# Patient Record
Sex: FEMALE | Race: BLACK OR AFRICAN AMERICAN | NOT HISPANIC OR LATINO | Employment: FULL TIME | URBAN - METROPOLITAN AREA
[De-identification: names, ages, dates, MRNs, and addresses within clinical notes are randomized per-mention and may not be internally consistent; named-entity substitution may affect disease eponyms.]

---
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
## 2022-07-24 ENCOUNTER — HOSPITAL ENCOUNTER (EMERGENCY)
Facility: CLINIC | Age: 22
Discharge: HOME | End: 2022-07-25
Attending: EMERGENCY MEDICINE | Admitting: EMERGENCY MEDICINE
Payer: COMMERCIAL

## 2022-07-24 DIAGNOSIS — J06.9 VIRAL URI WITH COUGH: ICD-10-CM

## 2022-07-24 DIAGNOSIS — U07.1 INFECTION DUE TO 2019 NOVEL CORONAVIRUS: ICD-10-CM

## 2022-07-24 DIAGNOSIS — H66.001 ACUTE SUPPURATIVE OTITIS MEDIA OF RIGHT EAR WITHOUT SPONTANEOUS RUPTURE OF TYMPANIC MEMBRANE, RECURRENCE NOT SPECIFIED: ICD-10-CM

## 2022-07-24 LAB — DEPRECATED S PYO AG THROAT QL EIA: NEGATIVE

## 2022-07-24 PROCEDURE — 250N000013 HC RX MED GY IP 250 OP 250 PS 637: Performed by: EMERGENCY MEDICINE

## 2022-07-24 PROCEDURE — 87651 STREP A DNA AMP PROBE: CPT | Performed by: EMERGENCY MEDICINE

## 2022-07-24 PROCEDURE — 87637 SARSCOV2&INF A&B&RSV AMP PRB: CPT | Performed by: EMERGENCY MEDICINE

## 2022-07-24 PROCEDURE — 99283 EMERGENCY DEPT VISIT LOW MDM: CPT | Mod: CS

## 2022-07-24 RX ORDER — ACETAMINOPHEN 500 MG
1000 TABLET ORAL ONCE
Status: COMPLETED | OUTPATIENT
Start: 2022-07-24 | End: 2022-07-24

## 2022-07-24 RX ADMIN — ACETAMINOPHEN 1000 MG: 500 TABLET, FILM COATED ORAL at 22:48

## 2022-07-24 NOTE — LETTER
July 25, 2022      To Whom It May Concern:      Patricia Wang was seen in our Emergency Department today, 07/25/22.  I expect her condition to improve over the next 3 days.  She may return to work when improved.    Sincerely,        Yarelis HORTON RN

## 2022-07-25 VITALS
RESPIRATION RATE: 16 BRPM | OXYGEN SATURATION: 99 % | DIASTOLIC BLOOD PRESSURE: 60 MMHG | TEMPERATURE: 99.3 F | HEART RATE: 107 BPM | SYSTOLIC BLOOD PRESSURE: 99 MMHG

## 2022-07-25 LAB
FLUAV RNA SPEC QL NAA+PROBE: NEGATIVE
FLUBV RNA RESP QL NAA+PROBE: NEGATIVE
GROUP A STREP BY PCR: NOT DETECTED
RSV RNA SPEC NAA+PROBE: NEGATIVE
SARS-COV-2 RNA RESP QL NAA+PROBE: POSITIVE

## 2022-07-25 PROCEDURE — 250N000013 HC RX MED GY IP 250 OP 250 PS 637: Performed by: EMERGENCY MEDICINE

## 2022-07-25 PROCEDURE — 250N000009 HC RX 250: Performed by: EMERGENCY MEDICINE

## 2022-07-25 RX ORDER — METOCLOPRAMIDE 10 MG/1
10 TABLET ORAL 3 TIMES DAILY PRN
Qty: 20 TABLET | Refills: 0 | Status: SHIPPED | OUTPATIENT
Start: 2022-07-25 | End: 2022-07-25

## 2022-07-25 RX ORDER — AMOXICILLIN 875 MG
875 TABLET ORAL ONCE
Status: COMPLETED | OUTPATIENT
Start: 2022-07-25 | End: 2022-07-25

## 2022-07-25 RX ORDER — OXYMETAZOLINE HYDROCHLORIDE 0.05 G/100ML
2 SPRAY NASAL ONCE
Status: COMPLETED | OUTPATIENT
Start: 2022-07-25 | End: 2022-07-25

## 2022-07-25 RX ORDER — AMOXICILLIN 875 MG
875 TABLET ORAL EVERY 12 HOURS SCHEDULED
Status: DISCONTINUED | OUTPATIENT
Start: 2022-07-25 | End: 2022-07-25

## 2022-07-25 RX ORDER — AMOXICILLIN 875 MG
875 TABLET ORAL 2 TIMES DAILY
Qty: 14 TABLET | Refills: 0 | Status: SHIPPED | OUTPATIENT
Start: 2022-07-25 | End: 2022-08-01

## 2022-07-25 RX ORDER — METOCLOPRAMIDE 10 MG/1
10 TABLET ORAL 3 TIMES DAILY PRN
Qty: 20 TABLET | Refills: 0 | Status: SHIPPED | OUTPATIENT
Start: 2022-07-25

## 2022-07-25 RX ADMIN — AMOXICILLIN 875 MG: 875 TABLET, COATED ORAL at 00:41

## 2022-07-25 RX ADMIN — Medication 2 SPRAY: at 00:41

## 2022-07-25 ASSESSMENT — ENCOUNTER SYMPTOMS
DIFFICULTY URINATING: 0
FREQUENCY: 0
VOMITING: 0
DYSURIA: 0
FEVER: 1
SORE THROAT: 1
COUGH: 1
DIARRHEA: 0

## 2022-07-25 NOTE — ED PROVIDER NOTES
History   Chief Complaint:  Multiple symptoms     The history is provided by the patient.      Patricia Wang is a 22 year old female who presents with left ear pressure. The patient reports that she has been having right ear pressure and states that she cannot hear out of that ear either, with associated sore throat, cough, and fever. She states that she also had strep throat about 3 weeks ago but was unable to finish her antibiotics. She denies any vomiting, diarrhea, urinary issues, or ear drainage.     Review of Systems   Constitutional: Positive for fever.   HENT: Positive for ear pain, hearing loss and sore throat. Negative for ear discharge.    Respiratory: Positive for cough.    Gastrointestinal: Negative for diarrhea and vomiting.   Genitourinary: Negative for difficulty urinating, dysuria and frequency.   All other systems reviewed and are negative.        Allergies:  The patient has no known allergies.     Medications:  Adalimumab  Loteprednol    Past Medical History:     Secondary Iridocyclitis, Noninfectious   Juvenile Rheumatoid Arthritis With Systemic Onset Unspecified Site (HCC)  Major Depressive Disorder  Psoriasiform Eruption  Anemia, Iron Deficiency  Chronic Neutropenia    Family History:    Pacemaker - Father  Asthma - Brother    Social History:  The patient presents to the ED alone.   Arrived via private car.    Physical Exam     Patient Vitals for the past 24 hrs:   BP Temp Temp src Pulse Resp SpO2   07/24/22 2245 99/60 99.3  F (37.4  C) Temporal 107 16 99 %       Physical Exam    HEENT:         Normal conjunctiva, No  discharge           R TM erythematous and suppurative effusion, external ear and EAC normal         L  TM normal, external ear and EAC normal           Posterior oropharynx:               Yes erythema,               No exudates,               Tonsillar hypertrophy - no              uvula midline - Yes    Neck: no adenopathy      Lungs:     clear to auscultation    Heart:  Regular, no m/r/g, ppi    Neuro: alert, no focal gross focal deficits    Psych: Normal mood and affect            Emergency Department Course   Laboratory:  Labs Ordered and Resulted from Time of ED Arrival to Time of ED Departure   INFLUENZA A/B & SARS-COV2 PCR MULTIPLEX - Abnormal       Result Value    Influenza A PCR Negative      Influenza B PCR Negative      RSV PCR Negative      SARS CoV2 PCR Positive (*)    STREPTOCOCCUS A RAPID SCREEN W REFELX TO PCR - Normal    Group A Strep antigen Negative     GROUP A STREPTOCOCCUS PCR THROAT SWAB        Emergency Department Course:     Reviewed:  I reviewed nursing notes, vitals and past medical history    Assessments:  2359 I obtained history and examined the patient as noted above.     Interventions:  2248 Tylenol 1,000 mg oral   0041 Amoxicillin 875 mg goral  0041 Afrin 0.05% spray 2 sprays nasal     Disposition:  The patient was discharged to home.     Impression & Plan   Medical Decision Making:  URI sx with R AOM.  Amox for AOM, covid +.  No hypoxia, appears well, DC home.      Diagnosis:    ICD-10-CM    1. Viral URI with cough  J06.9    2. Acute suppurative otitis media of right ear without spontaneous rupture of tympanic membrane, recurrence not specified  H66.001    3. Infection due to 2019 novel coronavirus  U07.1        Discharge Medications:  New Prescriptions    AMOXICILLIN (AMOXIL) 875 MG TABLET    Take 1 tablet (875 mg) by mouth 2 times daily for 7 days    METOCLOPRAMIDE (REGLAN) 10 MG TABLET    Take 1 tablet (10 mg) by mouth 3 times daily as needed (Nausea or Vomiting)       Scribe Disclosure:  I, Oscar Galicia, am serving as a scribe at 11:25 PM on 7/24/2022 to document services personally performed by Keith Rios MD based on my observations and the provider's statements to me.            Keith Rios MD  07/25/22 0280

## 2022-07-25 NOTE — ED TRIAGE NOTES
"Right ear pain, posterior headache, cough, \"a lot of mucus in my nose\". Pt had strep a couple weeks ago - stopped abx early. Vaccinated for covid, no booster.      "

## 2023-02-09 ENCOUNTER — HOSPITAL ENCOUNTER (EMERGENCY)
Facility: CLINIC | Age: 23
Discharge: HOME | End: 2023-02-09
Attending: STUDENT IN AN ORGANIZED HEALTH CARE EDUCATION/TRAINING PROGRAM | Admitting: STUDENT IN AN ORGANIZED HEALTH CARE EDUCATION/TRAINING PROGRAM
Payer: COMMERCIAL

## 2023-02-09 ENCOUNTER — APPOINTMENT (OUTPATIENT)
Dept: ULTRASOUND IMAGING | Facility: CLINIC | Age: 23
End: 2023-02-09
Attending: STUDENT IN AN ORGANIZED HEALTH CARE EDUCATION/TRAINING PROGRAM
Payer: COMMERCIAL

## 2023-02-09 VITALS
OXYGEN SATURATION: 98 % | TEMPERATURE: 98.2 F | BODY MASS INDEX: 16.48 KG/M2 | WEIGHT: 96 LBS | HEART RATE: 89 BPM | RESPIRATION RATE: 20 BRPM | DIASTOLIC BLOOD PRESSURE: 62 MMHG | SYSTOLIC BLOOD PRESSURE: 109 MMHG

## 2023-02-09 DIAGNOSIS — R19.7 VOMITING AND DIARRHEA: ICD-10-CM

## 2023-02-09 DIAGNOSIS — R11.10 VOMITING AND DIARRHEA: ICD-10-CM

## 2023-02-09 LAB
ALBUMIN SERPL BCG-MCNC: 4.3 G/DL (ref 3.5–5.2)
ALBUMIN UR-MCNC: 50 MG/DL
ALP SERPL-CCNC: 82 U/L (ref 35–104)
ALT SERPL W P-5'-P-CCNC: 15 U/L (ref 10–35)
ANION GAP SERPL CALCULATED.3IONS-SCNC: 16 MMOL/L (ref 7–15)
APPEARANCE UR: ABNORMAL
AST SERPL W P-5'-P-CCNC: 38 U/L (ref 10–35)
BASOPHILS # BLD AUTO: 0 10E3/UL (ref 0–0.2)
BASOPHILS NFR BLD AUTO: 0 %
BILIRUB DIRECT SERPL-MCNC: <0.2 MG/DL (ref 0–0.3)
BILIRUB SERPL-MCNC: 0.3 MG/DL
BILIRUB UR QL STRIP: NEGATIVE
BUN SERPL-MCNC: 5.9 MG/DL (ref 6–20)
CALCIUM SERPL-MCNC: 8.6 MG/DL (ref 8.6–10)
CHLORIDE SERPL-SCNC: 101 MMOL/L (ref 98–107)
COLOR UR AUTO: YELLOW
CREAT SERPL-MCNC: 0.56 MG/DL (ref 0.51–0.95)
DEPRECATED HCO3 PLAS-SCNC: 23 MMOL/L (ref 22–29)
DEPRECATED S PYO AG THROAT QL EIA: NEGATIVE
EOSINOPHIL # BLD AUTO: 0 10E3/UL (ref 0–0.7)
EOSINOPHIL NFR BLD AUTO: 0 %
ERYTHROCYTE [DISTWIDTH] IN BLOOD BY AUTOMATED COUNT: 21 % (ref 10–15)
GFR SERPL CREATININE-BSD FRML MDRD: >90 ML/MIN/1.73M2
GLUCOSE SERPL-MCNC: 114 MG/DL (ref 70–99)
GLUCOSE UR STRIP-MCNC: NEGATIVE MG/DL
GROUP A STREP BY PCR: NOT DETECTED
HCG UR QL: NEGATIVE
HCT VFR BLD AUTO: 33.5 % (ref 35–47)
HGB BLD-MCNC: 9.5 G/DL (ref 11.7–15.7)
HGB UR QL STRIP: ABNORMAL
HOLD SPECIMEN: NORMAL
HOLD SPECIMEN: NORMAL
IMM GRANULOCYTES # BLD: 0 10E3/UL
IMM GRANULOCYTES NFR BLD: 0 %
KETONES UR STRIP-MCNC: ABNORMAL MG/DL
LEUKOCYTE ESTERASE UR QL STRIP: NEGATIVE
LYMPHOCYTES # BLD AUTO: 1 10E3/UL (ref 0.8–5.3)
LYMPHOCYTES NFR BLD AUTO: 14 %
MCH RBC QN AUTO: 18.8 PG (ref 26.5–33)
MCHC RBC AUTO-ENTMCNC: 28.4 G/DL (ref 31.5–36.5)
MCV RBC AUTO: 66 FL (ref 78–100)
MONOCYTES # BLD AUTO: 0.7 10E3/UL (ref 0–1.3)
MONOCYTES NFR BLD AUTO: 9 %
MUCOUS THREADS #/AREA URNS LPF: PRESENT /LPF
NEUTROPHILS # BLD AUTO: 5.5 10E3/UL (ref 1.6–8.3)
NEUTROPHILS NFR BLD AUTO: 77 %
NITRATE UR QL: NEGATIVE
NRBC # BLD AUTO: 0 10E3/UL
NRBC BLD AUTO-RTO: 0 /100
PH UR STRIP: 7.5 [PH] (ref 5–7)
PLATELET # BLD AUTO: 376 10E3/UL (ref 150–450)
POTASSIUM SERPL-SCNC: 3.4 MMOL/L (ref 3.4–5.3)
PROT SERPL-MCNC: 8.3 G/DL (ref 6.4–8.3)
RBC # BLD AUTO: 5.06 10E6/UL (ref 3.8–5.2)
RBC URINE: >182 /HPF
SODIUM SERPL-SCNC: 140 MMOL/L (ref 136–145)
SP GR UR STRIP: 1.02 (ref 1–1.03)
SQUAMOUS EPITHELIAL: 6 /HPF
UROBILINOGEN UR STRIP-MCNC: 2 MG/DL
WBC # BLD AUTO: 7.2 10E3/UL (ref 4–11)
WBC URINE: 4 /HPF

## 2023-02-09 PROCEDURE — 81001 URINALYSIS AUTO W/SCOPE: CPT | Performed by: STUDENT IN AN ORGANIZED HEALTH CARE EDUCATION/TRAINING PROGRAM

## 2023-02-09 PROCEDURE — 80053 COMPREHEN METABOLIC PANEL: CPT | Performed by: EMERGENCY MEDICINE

## 2023-02-09 PROCEDURE — 99285 EMERGENCY DEPT VISIT HI MDM: CPT | Mod: 25

## 2023-02-09 PROCEDURE — 96361 HYDRATE IV INFUSION ADD-ON: CPT

## 2023-02-09 PROCEDURE — 96374 THER/PROPH/DIAG INJ IV PUSH: CPT

## 2023-02-09 PROCEDURE — 81025 URINE PREGNANCY TEST: CPT | Performed by: STUDENT IN AN ORGANIZED HEALTH CARE EDUCATION/TRAINING PROGRAM

## 2023-02-09 PROCEDURE — 85025 COMPLETE CBC W/AUTO DIFF WBC: CPT | Performed by: EMERGENCY MEDICINE

## 2023-02-09 PROCEDURE — 93005 ELECTROCARDIOGRAM TRACING: CPT

## 2023-02-09 PROCEDURE — 258N000003 HC RX IP 258 OP 636: Performed by: STUDENT IN AN ORGANIZED HEALTH CARE EDUCATION/TRAINING PROGRAM

## 2023-02-09 PROCEDURE — 76856 US EXAM PELVIC COMPLETE: CPT

## 2023-02-09 PROCEDURE — 82248 BILIRUBIN DIRECT: CPT | Performed by: STUDENT IN AN ORGANIZED HEALTH CARE EDUCATION/TRAINING PROGRAM

## 2023-02-09 PROCEDURE — 250N000011 HC RX IP 250 OP 636: Performed by: STUDENT IN AN ORGANIZED HEALTH CARE EDUCATION/TRAINING PROGRAM

## 2023-02-09 PROCEDURE — 87651 STREP A DNA AMP PROBE: CPT | Performed by: EMERGENCY MEDICINE

## 2023-02-09 PROCEDURE — 36415 COLL VENOUS BLD VENIPUNCTURE: CPT | Performed by: EMERGENCY MEDICINE

## 2023-02-09 RX ORDER — PROCHLORPERAZINE MALEATE 10 MG
10 TABLET ORAL EVERY 6 HOURS PRN
Qty: 10 TABLET | Refills: 0 | Status: SHIPPED | OUTPATIENT
Start: 2023-02-09

## 2023-02-09 RX ADMIN — SODIUM CHLORIDE 500 ML: 9 INJECTION, SOLUTION INTRAVENOUS at 17:12

## 2023-02-09 RX ADMIN — SODIUM CHLORIDE 1000 ML: 9 INJECTION, SOLUTION INTRAVENOUS at 12:04

## 2023-02-09 RX ADMIN — PROCHLORPERAZINE EDISYLATE 10 MG: 5 INJECTION INTRAMUSCULAR; INTRAVENOUS at 12:04

## 2023-02-09 ASSESSMENT — ACTIVITIES OF DAILY LIVING (ADL)
ADLS_ACUITY_SCORE: 35
ADLS_ACUITY_SCORE: 33
ADLS_ACUITY_SCORE: 33

## 2023-02-09 ASSESSMENT — ENCOUNTER SYMPTOMS
NAUSEA: 1
ABDOMINAL PAIN: 1
VOMITING: 1
FEVER: 0

## 2023-02-09 NOTE — ED NOTES
Rapid Assessment Note    History:   Patricia Wang is a 22 year old female who presents with nausea and vomiting since 0400. Patient reports she woke up and had multiple episodes of emesis. This continued throughout the morning. She has also had 2 syncopal episodes since this morning. Patient endorses chest pain and abdominal pain associated with the vomiting. Sister also states she has been shaking and has dyspnea after episodes of emesis. Patient is sexually active, last intercourse was 1 week ago. She is currently menstruating and doubts pregnancy. She reports smoking marijuana, last was 1 week ago, and vape use. She reports drinking 1 shooter of alcohol last night. She notes history of rheumatoid arthritis and she takes Remicade for this. She denies history of abdominal surgeries. She denies fever.       Exam:   General:  Alert, interactive  Cardiovascular:  Well perfused  Lungs:  No respiratory distress, no accessory muscle use  Abdominal: Soft and nontender.  Neuro:  Moving all 4 extremities  Skin:  Warm, dry  Psych:  Normal affect    Plan of Care:   I evaluated the patient and developed an initial plan of care. I discussed this plan and explained that I, or one of my partners, would be returning to complete the evaluation.         I, Keren Tubbs, am serving as a scribe to document services personally performed by Sarah Soares PA-C, based on my observations and the provider's statements to me.    2/9/2023  [employer]    Portions of this medical record were completed by a scribe. UPON MY REVIEW AND AUTHENTICATION BY ELECTRONIC SIGNATURE, this confirms (a) I performed the applicable clinical services, and (b) the record is accurate.       Sarah Soares PA-C  02/09/23 8531

## 2023-02-09 NOTE — ED PROVIDER NOTES
History     Chief Complaint:  Nausea & Vomiting and Chest Pain       The history is provided by the patient.      Patricia Wang is a 22 year old female who presents with nausea and vomiting since 0400. Patient reports she woke up and had multiple episodes of emesis. This continued throughout the morning. She has also had 2 syncopal episodes since this morning. Patient endorses chest pain and abdominal pain associated with the vomiting, however this subsides between episodes. Sister also states she has been shaking and has dyspnea after episodes of emesis. Also reports an episode of diarrhea. Patient is sexually active, last intercourse was 1 week ago. She is currently menstruating and doubts pregnancy. She reports smoking marijuana, last was 1 week ago, and vape use. She reports drinking 1 shooter of alcohol last night. She notes history of rheumatoid arthritis and she takes Remicade for this. She denies history of abdominal surgeries. She denies fever.    Independent Historian: None    Review of External Notes: None     ROS:  Review of Systems   Constitutional: Negative for fever.   Cardiovascular: Positive for chest pain.   Gastrointestinal: Positive for abdominal pain, nausea and vomiting.   Neurological: Positive for syncope.   All other systems reviewed and are negative.      Allergies:  Zofran [Ondansetron]     Medications:    Humira  Reglan    Past Medical History:    Juvenile idiopathic arthritis  Right Myopia  Uveitis  Depression  Psoriasiform eruption  Anemia  Lowbone density  Vitamin D deficiency  Neutropenia  Iridocyclitis  EBV infection    Past Surgical History:    The patient denies past surgical history     Family History:    Father: pacemaker  Brother: asthma    Social History:  The patient smokes marijuana  PCP: ORLANDO Alfonso     Physical Exam     Patient Vitals for the past 24 hrs:   BP Temp Temp src Pulse Resp SpO2 Weight   02/09/23 0825 107/54 98.2  F (36.8  C) Temporal 102 22 99 % 43.5 kg  (96 lb)   02/09/23 0824 -- -- -- -- 18 98 % --        Physical Exam  Vitals: Reviewed, as above.    General: Alert and oriented, in mild distress. Resting on bed.  Skin: Warm and well-perfused. No rashes, lesions, or erythema.   HEENT:   Head: Normocephalic, atraumatic. Facial features symmetric.   Eyes: Conjunctiva pink, sclera white. EOMs grossly intact.   Ears: Auricles without lesion, erythema, or edema.   Nose: Symmetric with no discharge.  Mouth and throat: Lips are moist with no chapping, lesions, or edema, Buccal mucosa is pink and moist without lesions. Oropharyngeal mucosa is pink and moist with no erythema, edema, or exudate. Uvula is midline.  Neck: Supple with no lymphadenopathy. Full ROM.   Pulmonary: Chest wall expansion symmetric with no increased work of breathing. Lungs clear to auscultation bilaterally.   Cardiovascular: Heart RRR with no murmurs. 2+ radial and tibialis posterior pulses bilaterally. No peripheral edema.  Abdominal: No hernias or distension. Bowel sounds present and physiologic. Abdomen is soft and nontender to light and deep palpation in all 4 quadrants with no guarding or rebound. No masses or organomegaly.   Musculoskeletal: Moves all extremities spontaneously.  Psych: Affect appropriate.  Answers questions appropriately. Patient appears calm.    Emergency Department Course   ECG  ECG results from 02/09/23   EKG 12-lead, tracing only     Value    Systolic Blood Pressure     Diastolic Blood Pressure     Ventricular Rate 84    Atrial Rate 84    CO Interval 114    QRS Duration 86        QTc 467    P Axis 5    R AXIS 77    T Axis 56    Interpretation ECG      Sinus rhythm  Normal ECG  When compared with ECG of 20-APR-2021 14:14,  No significant change was found           Imaging:  US Pelvis Cmplt w Transvag & Doppler LmtPel Duplex Limited   Final Result   IMPRESSION:     1.  Normal pelvic ultrasound.                  Report per radiology    Laboratory:  Labs Ordered and  Resulted from Time of ED Arrival to Time of ED Departure   BASIC METABOLIC PANEL - Abnormal       Result Value    Sodium 140      Potassium 3.4      Chloride 101      Carbon Dioxide (CO2) 23      Anion Gap 16 (*)     Urea Nitrogen 5.9 (*)     Creatinine 0.56      Calcium 8.6      Glucose 114 (*)     GFR Estimate >90     CBC WITH PLATELETS AND DIFFERENTIAL - Abnormal    WBC Count 7.2      RBC Count 5.06      Hemoglobin 9.5 (*)     Hematocrit 33.5 (*)     MCV 66 (*)     MCH 18.8 (*)     MCHC 28.4 (*)     RDW 21.0 (*)     Platelet Count 376      % Neutrophils 77      % Lymphocytes 14      % Monocytes 9      % Eosinophils 0      % Basophils 0      % Immature Granulocytes 0      NRBCs per 100 WBC 0      Absolute Neutrophils 5.5      Absolute Lymphocytes 1.0      Absolute Monocytes 0.7      Absolute Eosinophils 0.0      Absolute Basophils 0.0      Absolute Immature Granulocytes 0.0      Absolute NRBCs 0.0     ROUTINE UA WITH MICROSCOPIC REFLEX TO CULTURE - Abnormal    Color Urine Yellow      Appearance Urine Slightly Cloudy (*)     Glucose Urine Negative      Bilirubin Urine Negative      Ketones Urine Trace (*)     Specific Gravity Urine 1.025      Blood Urine Large (*)     pH Urine 7.5 (*)     Protein Albumin Urine 50 (*)     Urobilinogen Urine 2.0      Nitrite Urine Negative      Leukocyte Esterase Urine Negative      Mucus Urine Present (*)     RBC Urine >182 (*)     WBC Urine 4      Squamous Epithelials Urine 6 (*)    HEPATIC FUNCTION PANEL - Abnormal    Protein Total 8.3      Albumin 4.3      Bilirubin Total 0.3      Alkaline Phosphatase 82      AST 38 (*)     ALT 15      Bilirubin Direct <0.20     HCG QUALITATIVE URINE - Normal    hCG Urine Qualitative Negative     STREPTOCOCCUS A RAPID SCREEN W REFELX TO PCR - Normal    Group A Strep antigen Negative     GROUP A STREPTOCOCCUS PCR THROAT SWAB - Normal    Group A strep by PCR Not Detected          Emergency Department Course & Assessments:      Interventions:  Medications   0.9% sodium chloride BOLUS (500 mLs Intravenous New Bag 2/9/23 1712)   prochlorperazine (COMPAZINE) injection 10 mg (10 mg Intravenous Given 2/9/23 1204)   0.9% sodium chloride BOLUS (0 mLs Intravenous Stopped 2/9/23 1610)      Independent Interpretation (X-rays, CTs, rhythm strip):  None     Consultations/Discussion of Management or Tests:   ED Course as of 02/09/23 1730   Thu Feb 09, 2023   1130 I examined the patient and obtained history, as noted above.   1553 I rechecked the patient. She requested ice water. Repeat abdominal exam benign with no tenderness.   1729 I rechecked the patient and explained findings.  She has tolerated 2 cups of water and reported that she felt much improved and ready to go home.       Social Determinants of Health affecting care:   None      Disposition:  The patient was discharged to home.     Impression & Plan    Medical Decision Making:  Patricia Wang is a 22 year old female who presents with nausea and vomiting since 0400.  He states prior to the exam before the test.  Differential diagnosis includes appendicitis, diverticulitis, cholecystitis, kidney stone, UTI, ovarian cyst, variant torsion, ectopic pregnancy, enteritis, among others.  Patient has a largely reassuring physical exam with a benign abdomen.  Her urine is not consistent with infection. She does have RBCs present, as she is on her period, and this is less concerning for kidney stone.  Her nausea improved with Compazine and fluids.  Strep testing is negative. Pregnancy test is negative. Patient has no leukocytosis, reassuring against appendicitis.  Remainder of laboratory evaluation is unremarkable. She has chronic anemia, and her hemoglobin is improved from prior. Pelvic ultrasound is not consistent with torsion, with normal ovarian size and no free fluid, as well as bilateral flow visualized.  Etiology seems related to possible alcohol intake last night with no emergent etiology  identified today.  On patient reports that her nausea has resolved, she feels ready to go home.  She has taken 2 cups of water by mouth here.  At this time, I feel she is safe for discharge to home with close return precautions should she have worsening pain, intractable vomiting, or fevers.  Otherwise, she can follow-up closely with her primary care provider for ongoing evaluation and care.  I did provide her with a prescription for Compazine, as this resolved her nausea here.  She verbalized understanding, and she is comfortable with this plan.     Diagnosis:    ICD-10-CM    1. Vomiting and diarrhea  R11.10     R19.7            Discharge Medications:  New Prescriptions    PROCHLORPERAZINE (COMPAZINE) 10 MG TABLET    Take 1 tablet (10 mg) by mouth every 6 hours as needed for nausea or vomiting          Scribe Disclosure:  I, Robin Mulligan, am serving as a scribe at 3:52 PM on 2/9/2023 to document services personally performed by Sarah Soares PA-C based on my observations and the provider's statements to me.     2/9/2023   Sarah Soares PA-C Sells, Jenna, PA-C  02/09/23 1731       Sarah Soares PA-C  02/09/23 1732

## 2023-02-10 LAB
ATRIAL RATE - MUSE: 84 BPM
DIASTOLIC BLOOD PRESSURE - MUSE: NORMAL MMHG
INTERPRETATION ECG - MUSE: NORMAL
P AXIS - MUSE: 5 DEGREES
PR INTERVAL - MUSE: 114 MS
QRS DURATION - MUSE: 86 MS
QT - MUSE: 396 MS
QTC - MUSE: 467 MS
R AXIS - MUSE: 77 DEGREES
SYSTOLIC BLOOD PRESSURE - MUSE: NORMAL MMHG
T AXIS - MUSE: 56 DEGREES
VENTRICULAR RATE- MUSE: 84 BPM

## 2023-06-03 ENCOUNTER — OFFICE VISIT (OUTPATIENT)
Dept: URGENT CARE | Facility: URGENT CARE | Age: 23
End: 2023-06-03
Payer: COMMERCIAL

## 2023-06-03 VITALS
HEART RATE: 58 BPM | SYSTOLIC BLOOD PRESSURE: 103 MMHG | BODY MASS INDEX: 17.75 KG/M2 | TEMPERATURE: 98.5 F | WEIGHT: 104 LBS | HEIGHT: 64 IN | DIASTOLIC BLOOD PRESSURE: 67 MMHG

## 2023-06-03 DIAGNOSIS — Z11.3 ENCOUNTER FOR SCREENING FOR INFECTIONS WITH A PREDOMINANTLY SEXUAL MODE OF TRANSMISSION: ICD-10-CM

## 2023-06-03 DIAGNOSIS — N89.8 VAGINAL DISCHARGE: ICD-10-CM

## 2023-06-03 DIAGNOSIS — N76.0 BACTERIAL VAGINOSIS: Primary | ICD-10-CM

## 2023-06-03 DIAGNOSIS — B96.89 BACTERIAL VAGINOSIS: Primary | ICD-10-CM

## 2023-06-03 DIAGNOSIS — B37.31 CANDIDAL VULVOVAGINITIS: ICD-10-CM

## 2023-06-03 LAB
CLUE CELLS: PRESENT
TRICHOMONAS, WET PREP: ABNORMAL
WBC'S/HIGH POWER FIELD, WET PREP: ABNORMAL
YEAST, WET PREP: PRESENT

## 2023-06-03 PROCEDURE — 87210 SMEAR WET MOUNT SALINE/INK: CPT | Performed by: PHYSICIAN ASSISTANT

## 2023-06-03 PROCEDURE — 87591 N.GONORRHOEAE DNA AMP PROB: CPT | Performed by: PHYSICIAN ASSISTANT

## 2023-06-03 PROCEDURE — 87491 CHLMYD TRACH DNA AMP PROBE: CPT | Performed by: PHYSICIAN ASSISTANT

## 2023-06-03 PROCEDURE — 86706 HEP B SURFACE ANTIBODY: CPT | Performed by: PHYSICIAN ASSISTANT

## 2023-06-03 PROCEDURE — 86803 HEPATITIS C AB TEST: CPT | Performed by: PHYSICIAN ASSISTANT

## 2023-06-03 PROCEDURE — 36415 COLL VENOUS BLD VENIPUNCTURE: CPT | Performed by: PHYSICIAN ASSISTANT

## 2023-06-03 PROCEDURE — 87340 HEPATITIS B SURFACE AG IA: CPT | Performed by: PHYSICIAN ASSISTANT

## 2023-06-03 PROCEDURE — 87389 HIV-1 AG W/HIV-1&-2 AB AG IA: CPT | Performed by: PHYSICIAN ASSISTANT

## 2023-06-03 PROCEDURE — 86780 TREPONEMA PALLIDUM: CPT | Performed by: PHYSICIAN ASSISTANT

## 2023-06-03 PROCEDURE — 99204 OFFICE O/P NEW MOD 45 MIN: CPT | Performed by: PHYSICIAN ASSISTANT

## 2023-06-03 RX ORDER — INFLIXIMAB 100 MG/10ML
INJECTION, POWDER, LYOPHILIZED, FOR SOLUTION INTRAVENOUS
COMMUNITY

## 2023-06-03 RX ORDER — FLUCONAZOLE 150 MG/1
TABLET ORAL
Qty: 2 TABLET | Refills: 0 | Status: SHIPPED | OUTPATIENT
Start: 2023-06-03

## 2023-06-03 RX ORDER — METRONIDAZOLE 500 MG/1
500 TABLET ORAL 2 TIMES DAILY
Qty: 14 TABLET | Refills: 0 | Status: SHIPPED | OUTPATIENT
Start: 2023-06-03

## 2023-06-03 RX ORDER — FLUCONAZOLE 150 MG/1
TABLET ORAL
Qty: 2 TABLET | Refills: 0 | Status: SHIPPED | OUTPATIENT
Start: 2023-06-03 | End: 2023-06-03

## 2023-06-03 RX ORDER — METRONIDAZOLE 500 MG/1
500 TABLET ORAL 2 TIMES DAILY
Qty: 14 TABLET | Refills: 0 | Status: SHIPPED | OUTPATIENT
Start: 2023-06-03 | End: 2023-06-03

## 2023-06-03 NOTE — PROGRESS NOTES
Assessment & Plan     1. Bacterial vaginosis    - metroNIDAZOLE (FLAGYL) 500 MG tablet; Take 1 tablet (500 mg) by mouth 2 times daily  Dispense: 14 tablet; Refill: 0    2. Candidal vulvovaginitis    - fluconazole (DIFLUCAN) 150 MG tablet; Take one tablet (150mg) now and then one tablet (150mg) after completing the antibiotic for a total of 2 doses.  Dispense: 2 tablet; Refill: 0    3. Vaginal discharge    -Wet prep is positive for yeast and bacterial vaginosis.  Negative for trichomonas  -Urine GC and chlamydia pending  - Chlamydia trachomatis/Neisseria gonorrhoeae by PCR - Clinic Collect  - Wet prep - Clinic Collect    4. Encounter for screening for infections with a predominantly sexual mode of transmission    -Patient aware we will contact her with the results  - **HIV Antigen Antibody Combo FUTURE 2mo  -  Treponema Abs w Reflex to RPR and Titer; Future  - Hepatitis B surface antigen  - Hepatitis B Surface Antibody  - Hepatitis C Screen Reflex to HCV RNA Quant and Genotype  - Treponema Abs w Reflex to RPR and Titer      Results for orders placed or performed in visit on 06/03/23   Wet prep - Clinic Collect     Status: Abnormal    Specimen: Vagina; Swab   Result Value Ref Range    Trichomonas Absent Absent    Yeast Present (A) Absent    Clue Cells Present (A) Absent    WBCs/high power field 2+ (A) None       Patient Instructions   Take antibiotic for bacterial vaginosis as indicated  Take the yeast infection one tablet now and then the other tablet after finishing the antibiotic      Return if symptoms worsen or fail to improve, for Follow up.    At the end of the encounter, I discussed results, diagnosis, medications. Discussed red flags for immediate return to clinic/ER, as well as indications for follow up if no improvement. Patient understood and agreed to plan. Patient was stable for discharge.    Wendi Gomez is a 22 year old female who presents to clinic today for the following health issues:  Chief  "Complaint   Patient presents with     Urgent Care     Week and a half ago itchiness on vagina, white discharge, foul odor. Pt has been using Hydrocortisone 1 % with some relief.      HPI  Patient reports vaginal discharge for over 1 week.  She notes discharge is white and yellow.  Patient is also concerned about STIs.  She wants urine and blood STI testing.  She denies dysuria, urgency, frequency, flank pain, back pain, nausea and vomiting.      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   Cardiovascular:      Rate and Rhythm: Normal rate and regular rhythm.   Pulmonary:      Effort: Pulmonary effort is normal.      Breath sounds: Normal breath sounds.   Abdominal:      General: Abdomen is flat.      Palpations: Abdomen is soft.      Tenderness: There is no abdominal tenderness. There is no right CVA tenderness or left CVA tenderness.   Lymphadenopathy:      Cervical: No cervical adenopathy.   Skin:     General: Skin is warm and dry.      Findings: No rash.   Neurological:      General: No focal deficit present.      Mental Status: She is alert and oriented to person, place, and time.   Psychiatric:         Mood and Affect: Mood normal.         Behavior: Behavior normal.              Sarah Antonio PA-C    "

## 2023-06-04 NOTE — PATIENT INSTRUCTIONS
Take antibiotic for bacterial vaginosis as indicated  Take the yeast infection one tablet now and then the other tablet after finishing the antibiotic

## 2023-06-05 LAB
C TRACH DNA SPEC QL PROBE+SIG AMP: NEGATIVE
HBV SURFACE AB SERPL IA-ACNC: 0 M[IU]/ML
HBV SURFACE AB SERPL IA-ACNC: NONREACTIVE M[IU]/ML
HBV SURFACE AG SERPL QL IA: NONREACTIVE
HCV AB SERPL QL IA: NONREACTIVE
HIV 1+2 AB+HIV1 P24 AG SERPL QL IA: NONREACTIVE
N GONORRHOEA DNA SPEC QL NAA+PROBE: NEGATIVE
T PALLIDUM AB SER QL: NONREACTIVE

## 2024-06-05 ENCOUNTER — HOSPITAL ENCOUNTER (EMERGENCY)
Facility: CLINIC | Age: 24
Discharge: HOME | End: 2024-06-05
Attending: EMERGENCY MEDICINE | Admitting: EMERGENCY MEDICINE

## 2024-06-05 ENCOUNTER — APPOINTMENT (OUTPATIENT)
Dept: GENERAL RADIOLOGY | Facility: CLINIC | Age: 24
End: 2024-06-05
Attending: EMERGENCY MEDICINE

## 2024-06-05 VITALS
HEIGHT: 64 IN | WEIGHT: 102 LBS | TEMPERATURE: 98.1 F | HEART RATE: 80 BPM | DIASTOLIC BLOOD PRESSURE: 57 MMHG | BODY MASS INDEX: 17.42 KG/M2 | SYSTOLIC BLOOD PRESSURE: 109 MMHG | OXYGEN SATURATION: 96 % | RESPIRATION RATE: 18 BRPM

## 2024-06-05 DIAGNOSIS — D64.9 ANEMIA, UNSPECIFIED TYPE: ICD-10-CM

## 2024-06-05 DIAGNOSIS — R07.9 ACUTE CHEST PAIN: ICD-10-CM

## 2024-06-05 LAB
ANION GAP SERPL CALCULATED.3IONS-SCNC: 12 MMOL/L (ref 7–15)
BASOPHILS # BLD AUTO: 0 10E3/UL (ref 0–0.2)
BASOPHILS NFR BLD AUTO: 0 %
BUN SERPL-MCNC: 8.3 MG/DL (ref 6–20)
CALCIUM SERPL-MCNC: 8.9 MG/DL (ref 8.6–10)
CHLORIDE SERPL-SCNC: 102 MMOL/L (ref 98–107)
CREAT SERPL-MCNC: 0.66 MG/DL (ref 0.51–0.95)
D DIMER PPP FEU-MCNC: 0.29 UG/ML FEU (ref 0–0.5)
DEPRECATED HCO3 PLAS-SCNC: 23 MMOL/L (ref 22–29)
EGFRCR SERPLBLD CKD-EPI 2021: >90 ML/MIN/1.73M2
EOSINOPHIL # BLD AUTO: 0.1 10E3/UL (ref 0–0.7)
EOSINOPHIL NFR BLD AUTO: 1 %
ERYTHROCYTE [DISTWIDTH] IN BLOOD BY AUTOMATED COUNT: 19.9 % (ref 10–15)
GLUCOSE SERPL-MCNC: 83 MG/DL (ref 70–99)
HCG SERPL QL: NEGATIVE
HCT VFR BLD AUTO: 30.8 % (ref 35–47)
HGB BLD-MCNC: 8.5 G/DL (ref 11.7–15.7)
IMM GRANULOCYTES # BLD: 0 10E3/UL
IMM GRANULOCYTES NFR BLD: 0 %
LYMPHOCYTES # BLD AUTO: 1.8 10E3/UL (ref 0.8–5.3)
LYMPHOCYTES NFR BLD AUTO: 25 %
MCH RBC QN AUTO: 18.1 PG (ref 26.5–33)
MCHC RBC AUTO-ENTMCNC: 27.6 G/DL (ref 31.5–36.5)
MCV RBC AUTO: 66 FL (ref 78–100)
MONOCYTES # BLD AUTO: 0.7 10E3/UL (ref 0–1.3)
MONOCYTES NFR BLD AUTO: 10 %
NEUTROPHILS # BLD AUTO: 4.6 10E3/UL (ref 1.6–8.3)
NEUTROPHILS NFR BLD AUTO: 64 %
NRBC # BLD AUTO: 0 10E3/UL
NRBC BLD AUTO-RTO: 0 /100
PLATELET # BLD AUTO: 218 10E3/UL (ref 150–450)
POTASSIUM SERPL-SCNC: 4 MMOL/L (ref 3.4–5.3)
RBC # BLD AUTO: 4.69 10E6/UL (ref 3.8–5.2)
SODIUM SERPL-SCNC: 137 MMOL/L (ref 135–145)
TROPONIN T SERPL HS-MCNC: <6 NG/L
WBC # BLD AUTO: 7.3 10E3/UL (ref 4–11)

## 2024-06-05 PROCEDURE — 71046 X-RAY EXAM CHEST 2 VIEWS: CPT

## 2024-06-05 PROCEDURE — 85025 COMPLETE CBC W/AUTO DIFF WBC: CPT | Performed by: EMERGENCY MEDICINE

## 2024-06-05 PROCEDURE — 84484 ASSAY OF TROPONIN QUANT: CPT | Performed by: EMERGENCY MEDICINE

## 2024-06-05 PROCEDURE — 80048 BASIC METABOLIC PNL TOTAL CA: CPT | Performed by: EMERGENCY MEDICINE

## 2024-06-05 PROCEDURE — 99285 EMERGENCY DEPT VISIT HI MDM: CPT | Mod: 25

## 2024-06-05 PROCEDURE — 84703 CHORIONIC GONADOTROPIN ASSAY: CPT | Performed by: EMERGENCY MEDICINE

## 2024-06-05 PROCEDURE — 36415 COLL VENOUS BLD VENIPUNCTURE: CPT | Performed by: EMERGENCY MEDICINE

## 2024-06-05 PROCEDURE — 85379 FIBRIN DEGRADATION QUANT: CPT | Performed by: EMERGENCY MEDICINE

## 2024-06-05 PROCEDURE — 250N000013 HC RX MED GY IP 250 OP 250 PS 637: Performed by: EMERGENCY MEDICINE

## 2024-06-05 PROCEDURE — 93005 ELECTROCARDIOGRAM TRACING: CPT

## 2024-06-05 RX ORDER — ALBUTEROL SULFATE 90 UG/1
2 AEROSOL, METERED RESPIRATORY (INHALATION) EVERY 6 HOURS PRN
Qty: 18 G | Refills: 0 | Status: SHIPPED | OUTPATIENT
Start: 2024-06-05

## 2024-06-05 RX ORDER — IBUPROFEN 200 MG
400 TABLET ORAL ONCE
Status: COMPLETED | OUTPATIENT
Start: 2024-06-05 | End: 2024-06-05

## 2024-06-05 RX ADMIN — IBUPROFEN 400 MG: 200 TABLET, FILM COATED ORAL at 20:14

## 2024-06-05 ASSESSMENT — COLUMBIA-SUICIDE SEVERITY RATING SCALE - C-SSRS
6. HAVE YOU EVER DONE ANYTHING, STARTED TO DO ANYTHING, OR PREPARED TO DO ANYTHING TO END YOUR LIFE?: NO
2. HAVE YOU ACTUALLY HAD ANY THOUGHTS OF KILLING YOURSELF IN THE PAST MONTH?: NO
1. IN THE PAST MONTH, HAVE YOU WISHED YOU WERE DEAD OR WISHED YOU COULD GO TO SLEEP AND NOT WAKE UP?: NO

## 2024-06-05 ASSESSMENT — ACTIVITIES OF DAILY LIVING (ADL)
ADLS_ACUITY_SCORE: 35

## 2024-06-06 LAB
ATRIAL RATE - MUSE: 77 BPM
DIASTOLIC BLOOD PRESSURE - MUSE: NORMAL MMHG
INTERPRETATION ECG - MUSE: NORMAL
P AXIS - MUSE: 20 DEGREES
PR INTERVAL - MUSE: 124 MS
QRS DURATION - MUSE: 88 MS
QT - MUSE: 366 MS
QTC - MUSE: 414 MS
R AXIS - MUSE: 80 DEGREES
SYSTOLIC BLOOD PRESSURE - MUSE: NORMAL MMHG
T AXIS - MUSE: 63 DEGREES
VENTRICULAR RATE- MUSE: 77 BPM

## 2024-06-06 NOTE — ED TRIAGE NOTES
Here for concern of pain to in between shoulder blade and right upper back started around 2am. Stated that when she woke up in the morning, pain is now radiating to her right chest. Pain feels like stabbing. Denies any injuries, fall, or lifting anything heavy. Movement, palpation, and breathing makes pain worse. ABCs intact.     Triage Assessment (Adult)       Row Name 06/05/24 1923          Triage Assessment    Airway WDL WDL        Respiratory WDL    Respiratory WDL WDL        Cardiac WDL    Cardiac WDL chest pain

## 2024-06-06 NOTE — DISCHARGE INSTRUCTIONS
Please monitor symptoms closely    I would recommend tylenol / ibuprofen as needed for pain / discomfort currently    Follow-up with your primary care provider in 2-3 days for re-check    Return to ER with any new or worsening symptoms.    Discharge Instructions  Chest Pain    You have been seen today for chest pain or discomfort.  At this time, your provider has found no signs that your chest pain is due to a serious or life-threatening condition, (or you have declined more testing and/or admission to the hospital). However, sometimes there is a serious problem that does not show up right away. Your evaluation today may not be complete and you may need further testing and evaluation.     Generally, every Emergency Department visit should have a follow-up clinic visit with either a primary or a specialty clinic/provider. Please follow-up as instructed by your emergency provider today.  Return to the Emergency Department if:  Your chest pain changes, gets worse, starts to happen more often, or comes with less activity.  You are newly short of breath.  You get very weak or tired.  You pass out or faint.  You have any new symptoms, like fever, cough, numb legs, or you cough up blood.  You have anything else that worries you.    Until you follow-up with your regular provider, please do the following:  Take one aspirin daily unless you have an allergy or are told not to by your provider.  If a stress test appointment has been made, go to the appointment.  If you have questions, contact your regular provider.  Follow-up with your regular provider/clinic as directed; this is very important.    If you were given a prescription for medicine here today, be sure to read all of the information (including the package insert) that comes with your prescription.  This will include important information about the medicine, its side effects, and any warnings that you need to know about.  The pharmacist who fills the prescription can  provide more information and answer questions you may have about the medicine.  If you have questions or concerns that the pharmacist cannot address, please call or return to the Emergency Department.       Remember that you can always come back to the Emergency Department if you are not able to see your regular provider in the amount of time listed above, if you get any new symptoms, or if there is anything that worries you.

## 2024-06-06 NOTE — ED PROVIDER NOTES
"  Emergency Department Note      History of Present Illness     Chief Complaint  Chest Pain    HPI  Patricia Wang is a 23 year old female with a history of rheumatoid arthritis who presents to the ED with chest pain. Patient reports that last night she began having pain that she attributed to muscle pain and then this morning when she woke up the pain was worse.  She had her brother massage the area last night. She reports that she has a sharp pain that is exacerbated when she breathes in, bends forward, coughs, or moves. At 1200 she began having shortness of breath. Patient came in today after work where she works at an [employer] and denies any heavy lifting. No history of blood clots, hormones or birth control, long car or plane rides. No fevers or chills. Has not tried any pain medication prior to my evaluation.      Independent Historian  None    Review of External Notes  I reviewed a clinic visit note from 2/1/24  and learned patient has a history of arthritis and is on Infliximab  Past Medical History   Medical History and Problem List  Juvenile idiopathic arthritis     Medications  Diflucan   Infliximab   Metoclopramide  Metronidazole  Prochlorperazine     Physical Exam   Patient Vitals for the past 24 hrs:   BP Temp Temp src Pulse Resp SpO2 Height Weight   06/05/24 2253 109/57 -- -- 80 -- 96 % -- --   06/05/24 1925 109/65 98.1  F (36.7  C) Temporal 82 18 100 % 1.626 m (5' 4\") 46.3 kg (102 lb)     Physical Exam    General:              Well-nourished              Speaking in full sentences  Eyes:              Conjunctiva without injection or scleral icterus  ENT:              Moist mucous membranes              Nares patent              Pinnae normal  Neck:              Full ROM              No stiffness appreciated  Resp:              Lungs CTAB              No crackles, wheezing or audible rubs              Good air movement  CV:                    Normal rate, regular rhythm              S1 and S2 " present              No murmur, gallop or rub  GI:              BS present              Abdomen soft without distention              Non-tender to light and deep palpation              No guarding or rebound tenderness  Skin:              Warm, dry, well perfused              No rashes or open wounds on exposed skin  MSK:              Moves all extremities              No focal deformities or swelling              Tenderness to palpation to right posterior hemithorax and right anterior chest wall   Palpation of this area distinctly reproduces her discomfort              No overlying skin changes              No left sided chest wall tenderness  Neuro:              Alert              Answers questions appropriately              Moves all extremities equally              Gait stable  Psych:              Normal affect, normal mood    Diagnostics   Lab Results   Labs Ordered and Resulted from Time of ED Arrival to Time of ED Departure   CBC WITH PLATELETS AND DIFFERENTIAL - Abnormal       Result Value    WBC Count 7.3      RBC Count 4.69      Hemoglobin 8.5 (*)     Hematocrit 30.8 (*)     MCV 66 (*)     MCH 18.1 (*)     MCHC 27.6 (*)     RDW 19.9 (*)     Platelet Count 218      % Neutrophils 64      % Lymphocytes 25      % Monocytes 10      % Eosinophils 1      % Basophils 0      % Immature Granulocytes 0      NRBCs per 100 WBC 0      Absolute Neutrophils 4.6      Absolute Lymphocytes 1.8      Absolute Monocytes 0.7      Absolute Eosinophils 0.1      Absolute Basophils 0.0      Absolute Immature Granulocytes 0.0      Absolute NRBCs 0.0     BASIC METABOLIC PANEL - Normal    Sodium 137      Potassium 4.0      Chloride 102      Carbon Dioxide (CO2) 23      Anion Gap 12      Urea Nitrogen 8.3      Creatinine 0.66      GFR Estimate >90      Calcium 8.9      Glucose 83     TROPONIN T, HIGH SENSITIVITY - Normal    Troponin T, High Sensitivity <6     HCG QUALITATIVE PREGNANCY - Normal    hCG Serum Qualitative Negative     D  DIMER QUANTITATIVE - Normal    D-Dimer Quantitative 0.29       Imaging  Chest XR,  PA & LAT   Final Result   IMPRESSION: Negative chest.        EKG   ECG taken at 1950, ECG read at 2001  Normal sinus rhythm   Normal ECG   No significant changes as compared to prior, dated 2/9/23.  Rate 77 bpm. SD interval 124 ms. QRS duration 88 ms. QT/QTc 366/414 ms. P-R-T axes 20 80 63.    Independent Interpretation  I reviewed the chest XR and see no acute or infiltrate or pneumothorax.   ED Course    Medications Administered  Medications   ibuprofen (ADVIL/MOTRIN) tablet 400 mg (400 mg Oral $Given 6/5/24 2014)     Discussion of Management  None    Social Determinants of Health adding to complexity of care  None    ED Course     2008 I obtained history and examined the patient as noted above.   Medical Decision Making / Diagnosis   CMS Diagnoses: None    MIPS     None    MDM  Patricia Wang is a 23-year-old female with a PMH significant for rheumatoid arthritis presenting to the ED for evaluation of chest pain.  VS on presentation unremarkable.  History, exam, and ED course as outlined above.  Presents etiology for discomfort not entirely clear, though I do have high suspicion for musculoskeletal etiologies as she exhibits distinctly reproducible tenderness with palpation over the right posterior hemithorax as well as anterior chest.  Her symptoms are exacerbated with rotational movements as well as bending forward.  In the absence of trauma or fall, low suspicion for acute bony abnormality.  She has no overlying skin changes to suggest zoster nor cellulitis.  ACS considered though felt unlikely given above history, and examination findings.  EKG demonstrates sinus rhythm without findings of acute ischemia and high-sensitivity troponin has returned within normal limits.  Pulmonary embolism also considered though felt unlikely as patient is low risk utilizing Wells criteria and D-dimer returned within normal limits.  Chest x-ray is  negative for pneumothorax or pneumonia.  I considered aortic dissection though again given her reproducible tenderness to palpation, feel this to be unlikely.  Mediastinum appears unremarkable and radial pulses are 2+ bilaterally and symmetric.  Patient provided the above supportive cares and noting improvement in symptoms. Labs reveal stable microcytic anemia (recommended F/U with PCP to discuss this further).  With reasonable clinical certainty I feel she can safely be discharged from the ED with supportive outpatient treatment.  Recommended alternating anti-inflammatory medications as well as use of topical lidocaine patches.  Appropriate dosing of this medication was discussed.  Return to ER with worsened pain, shortness of breath, fevers, chills, or any other concerns.  Otherwise follow-up with primary care provider in 2 to 3 days for recheck.  Patient was requesting a refill of her albuterol inhaler, which was provided.    Disposition  The patient was discharged.     ICD-10 Codes:    ICD-10-CM    1. Acute chest pain  R07.9       2. Anemia, unspecified type  D64.9          Discharge Medications  Discharge Medication List as of 6/5/2024 10:47 PM        START taking these medications    Details   albuterol (PROAIR HFA/PROVENTIL HFA/VENTOLIN HFA) 108 (90 Base) MCG/ACT inhaler Inhale 2 puffs into the lungs every 6 hours as needed for shortness of breath, wheezing or cough, Disp-18 g, R-0, E-PrescribePharmacy may dispense brand covered by insurance (Proair, or proventil or ventolin or generic albuterol inhaler)           Scribe Disclosure:  I, Ashish Redd, am serving as a scribe at 8:12 PM on 6/5/2024 to document services personally performed by Pako Victor MD based on my observations and the provider's statements to me.        Pako Victor MD  06/06/24 [id number]